# Patient Record
Sex: FEMALE | Race: ASIAN | NOT HISPANIC OR LATINO | Employment: UNEMPLOYED | URBAN - METROPOLITAN AREA
[De-identification: names, ages, dates, MRNs, and addresses within clinical notes are randomized per-mention and may not be internally consistent; named-entity substitution may affect disease eponyms.]

---

## 2020-06-19 ENCOUNTER — OFFICE VISIT (OUTPATIENT)
Dept: ORTHOPEDIC SURGERY | Facility: CLINIC | Age: 35
End: 2020-06-19

## 2020-06-19 VITALS — HEIGHT: 60 IN | BODY MASS INDEX: 29.43 KG/M2 | TEMPERATURE: 98 F | WEIGHT: 149.91 LBS

## 2020-06-19 DIAGNOSIS — M75.42 IMPINGEMENT SYNDROME OF LEFT SHOULDER: ICD-10-CM

## 2020-06-19 DIAGNOSIS — M25.512 LEFT SHOULDER PAIN, UNSPECIFIED CHRONICITY: Primary | ICD-10-CM

## 2020-06-19 PROCEDURE — 99203 OFFICE O/P NEW LOW 30 MIN: CPT | Performed by: ORTHOPAEDIC SURGERY

## 2020-06-19 PROCEDURE — 73030 X-RAY EXAM OF SHOULDER: CPT | Performed by: ORTHOPAEDIC SURGERY

## 2020-06-19 RX ORDER — NAPROXEN 500 MG/1
500 TABLET ORAL 2 TIMES DAILY
COMMUNITY
Start: 2020-04-14

## 2020-06-19 NOTE — PROGRESS NOTES
New Left Shoulder      Patient: Hussein Spangler        YOB: 1985    Medical Record Number: [id number]        Chief Complaints: left shoulder pain      History of Present Illness: This is a 35-year-old right-hand-dominant female who presents complaining of left shoulder pain is been ongoing for 2 months no history injury change in activity no history of similar symptoms.  She states she had this left wrist pain approximately 6 years ago when she was pregnant and left shoulder pain now is mostly the shoulder was hurting at night but she is been doing some anti-inflammatories and that is really resolved her symptoms.  Should be noted that her  was on FaceTime and was involved in the evaluation and exam in its entirety.  Current symptoms are moderate constant aching worse with standing sitting walking better with rest she is a homemaker past medical history is unremarkable      Allergies:   Allergies   Allergen Reactions   • Sulfamethoxazole-Trimethoprim Rash       Medications:   Home Medications:  Current Outpatient Medications on File Prior to Visit   Medication Sig   • naproxen (NAPROSYN) 500 MG tablet Take 500 mg by mouth 2 (Two) Times a Day.     No current facility-administered medications on file prior to visit.      Current Medications:  Scheduled Meds:  Continuous Infusions:  No current facility-administered medications for this visit.   PRN Meds:.    History reviewed. No pertinent past medical history.     Past Surgical History:   Procedure Laterality Date   •  SECTION          Social History     Occupational History   • Not on file   Tobacco Use   • Smoking status: Never Smoker   • Smokeless tobacco: Never Used   Substance and Sexual Activity   • Alcohol use: Never     Frequency: Never   • Drug use: Never   • Sexual activity: Not on file      Social History     Social History Narrative   • Not on file      History reviewed. No pertinent family history.          Review  "of Systems: 14 point review of systems are remarkable for the shoulder pain only the remainder negative per the patient    Review of Systems      Physical Exam: 35 y.o. female  General Appearance:    Alert, cooperative, in no acute distress                   Vitals:    06/19/20 1615   Temp: 98 °F (36.7 °C)   TempSrc: Temporal   Weight: 68 kg (149 lb 14.6 oz)   Height: 152.4 cm (60\")   PainSc:   5   PainLoc: Shoulder      Patient is alert and read ×3 no acute distress appears her above-listed at height weight and age.  Affect is normal respiratory rate is normal unlabored. Heart rate regular rate rhythm, sclera, dentition and hearing are normal for the purpose of this exam.    Ortho Exam    Physical exam of the left shoulder reveals no overlying skin changes no lymphedema no lymphadenopathy.  The patient can actively flex to 180, abduction is similar external rotation is to 50, internal rotation to the upper lumbar spine.  Rotator cuff strength is 4+ to 5 over 5 with isometric strength testing without symptoms.  The patient has good cervical range of motion full and asymptomatic no radicular symptoms and a normal elbow exam.  Patient has good distal pulses    Procedures          Radiology:   AP, Scapular Y and Axillary Lateral of the left shoulder were ordered/reviewed to evauate shoulder pain.  Imaging Results (Most Recent)     Procedure Component Value Units Date/Time    XR Shoulder 2+ View Left [[id number]] Resulted:  06/19/20 1431     Updated:  06/19/20 1610    Impression:       Ordering physician's impression is located in the Encounter Note dated 06/19/20. X-ray performed in the DR room.          Assessment/Plan: Left shoulder pain I really think this was probably more impingement she has a slight rounded shoulder posture which we talked about I suspect the anti-inflammatories have calm this down we did talk about the fact if it returns we now have x-rays my first line of treatment would probably be an injection " we also also talked about things to avoid

## 2020-07-10 ENCOUNTER — HOSPITAL ENCOUNTER (OUTPATIENT)
Dept: PHYSICAL THERAPY | Facility: HOSPITAL | Age: 35
Setting detail: THERAPIES SERIES
Discharge: HOME | End: 2020-07-10

## 2020-07-10 DIAGNOSIS — M25.512 CHRONIC LEFT SHOULDER PAIN: Primary | ICD-10-CM

## 2020-07-10 DIAGNOSIS — G89.29 CHRONIC LEFT SHOULDER PAIN: Primary | ICD-10-CM

## 2020-07-10 DIAGNOSIS — R29.898 WEAKNESS OF SHOULDER: ICD-10-CM

## 2020-07-10 PROCEDURE — 97161 PT EVAL LOW COMPLEX 20 MIN: CPT

## 2020-07-10 PROCEDURE — 97530 THERAPEUTIC ACTIVITIES: CPT

## 2020-07-10 NOTE — THERAPY EVALUATION
Outpatient Physical Therapy Ortho Initial Evaluation  [facility]     Patient Name: Hussein Spangler  : 1985  MRN: [id number]  Today's Date: 7/10/2020      Visit Date: 07/10/2020    There is no problem list on file for this patient.       History reviewed. No pertinent past medical history.     Past Surgical History:   Procedure Laterality Date   •  SECTION         Visit Dx:     ICD-10-CM ICD-9-CM   1. Chronic left shoulder pain M25.512 719.41    G89.29 338.29   2. Weakness of shoulder R29.898 719.61         Patient History     Row Name 07/10/20 1200             History    Chief Complaint  Pain  -RS      Type of Pain  Shoulder pain  -RS      Date Current Problem(s) Began  04/10/20  -RS      Brief Description of Current Complaint  The pt is a 34 yo female who presents with L shoulder pain of insidious onset, present  Since April or May. Her pain began in her wrist but that is gone, now it is in her shoulder. She was taking medicine for the pain but now she is no longer. Her pain has been improving gradually. She can dress fine but has trouble lifting or carrying, washing dishes, cleaning. She is sleeping better now than she did before. She has used icy hot but that didn't seem to make a difference. Rest is what helps. She denies numbness or tingling.    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  No  -RS      Difficulties with ADL's?  cleaning, cooking, lifting, carrying  -RS         Fall Risk Assessment    Any falls in the past year:  No  -RS      Number of falls reported in the last 12 months  --  -RS         Services    Are you currently receiving Home Health services  No  -RS         Daily Activities    Primary  Language  English  -RS      Are you able to read  Yes  -RS      Are you able to write  Yes  -RS      How does patient learn best?  Listening;Reading;Pictures/Video  -RS      Pt Participated in POC and Goals  Yes  -RS         Safety    Are you being hurt, hit, or frightened by anyone at home or in your life?  No  -RS      Are you being neglected by a caregiver  No  -RS        User Key  (r) = Recorded By, (t) = Taken By, (c) = Cosigned By    Initials Name Provider Type    RS Sophie Gold PT Physical Therapist          PT Ortho     Row Name 07/10/20 1200       Posture/Observations    Alignment Options  Forward head;Thoracic kyphosis;Rounded shoulders  -RS    Forward Head  Mild;Moderate  -RS    Thoracic Kyphosis  Mild  -RS    Rounded Shoulders  Moderate  -RS       Cervical/Shoulder ROM Screen    Cervical flexion  Normal  -RS    Cervical extension  Normal  -RS    Cervical lateral flexion  Normal  -RS    Cervical rotation  Normal  -RS    Cervical quadrant (Spurling's)  Normal  -RS       Special Tests/Palpation    Special Tests/Palpation  Shoulder  -RS       Shoulder Impingement/Rotator Cuff Special Tests    Bruce-George Test (RC Lesion vs. Bursitis)  Left:;Negative reports very mild pain  -RS    Neer Impingement Test (RC Lesion vs. Bursitis)  Left:;Negative  -RS    Empty Can Test (RC Lesion)  Bilateral:;Negative  -RS    Lift-Off Test (Subscapularis Lesion)  Bilateral:;Negative  -RS       General ROM    LT Upper Ext  Lt Shoulder ABduction;Lt Shoulder Flexion;Lt Shoulder External Rotation;Lt Shoulder Internal Rotation  -RS       Left Upper Ext    Lt Shoulder Abduction AROM  0-175 painfree  -RS    Lt Shoulder Flexion AROM  0-180 painfree  -RS    Lt Shoulder External Rotation AROM  ROCKY to mid thoracic spine painfree  -RS    Lt Shoulder Internal Rotation AROM  FIR to contralateral scapula  -RS       MMT (Manual Muscle Testing)    Rt Upper Ext  Rt Shoulder Flexion;Rt Shoulder ABduction;Rt Shoulder Internal Rotation;Rt  Shoulder External Rotation;Rt Elbow Flexion;Rt Elbow Extension  -RS    Lt Upper Ext  Lt Shoulder Flexion;Lt Shoulder ABduction;Lt Shoulder Internal Rotation;Lt Shoulder External Rotation;Lt Elbow Extension;Lt Elbow Flexion  -RS       MMT Right Upper Ext    Rt Shoulder Flexion MMT, Gross Movement  (4+/5) good plus  -RS    Rt Shoulder ABduction MMT, Gross Movement  (4+/5) good plus  -RS    Rt Shoulder Internal Rotation MMT, Gross Movement  (4+/5) good plus  -RS    Rt Shoulder External Rotation MMT, Gross Movement  (4/5) good  -RS    Rt Elbow Flexion MMT, Gross Movement:  (5/5) normal  -RS    Rt Elbow Extension MMT, Gross Movement:  (4+/5) good plus  -RS       MMT Left Upper Ext    Lt Shoulder Flexion MMT, Gross Movement  (4+/5) good plus  -RS    Lt Shoulder ABduction MMT, Gross Movement  (4/5) good  -RS    Lt Shoulder Internal Rotation MMT, Gross Movement  (4/5) good  -RS    Lt Shoulder External Rotation MMT, Gross Movement  (4/5) good  -RS    Lt Elbow Flexion MMT, Gross Movement  (4+/5) good plus  -RS    Lt Elbow Extension MMT, Gross Movement  (4+/5) good plus  -RS       Sensation    Sensation WNL?  WNL  -RS      User Key  (r) = Recorded By, (t) = Taken By, (c) = Cosigned By    Initials Name Provider Type    Sophie Larios PT Physical Therapist                      Therapy Education  Education Details: Role of outpatient PT, POC, differential diagnosis, initial HEP, expectations, goals, anatomy.  Given: Symptoms/condition management, HEP  Program: New  How Provided: Verbal, Demonstration, Written  Provided to: Patient  Level of Understanding: Teach back education performed, Demonstrated, Verbalized     PT OP Goals     Row Name 07/10/20 1200          PT Short Term Goals    STG Date to Achieve  07/12/20  -RS     STG 1  The pt will demonstrate IND and compliant with initial HEP focused on activation of scap stabilizing muscles and improved painfree mobility.  -RS     STG 1 Progress  New;Partially Met  -RS         Long Term Goals    LTG Date to Achieve  --  -RS     LTG 1  --  -RS     LTG 1 Progress  --  -RS     LTG 2  --  -RS     LTG 2 Progress  --  -RS     LTG 3  --  -RS     LTG 3 Progress  --  -RS        Time Calculation    PT Goal Re-Cert Due Date  10/08/20  -RS       User Key  (r) = Recorded By, (t) = Taken By, (c) = Cosigned By    Initials Name Provider Type    RS Sophie Gold, PT Physical Therapist          PT Assessment/Plan     Row Name 07/10/20 1200          PT Assessment    Functional Limitations  Limitation in home management;Performance in self-care ADL;Performance in leisure activities  -RS     Impairments  Range of motion;Poor body mechanics;Posture;Pain;Muscle strength;Joint mobility  -RS     Assessment Comments  Hussein Spangler is a 35 y.o. female referred to physical therapy for L shoulder pain. She presents with a stable clinical presentation, along with no remarkable comorbidities or personal factors that may impact her progress in the plan of care. Pt presents today with rounded shoulders, some decrease in shoulder ER strength, some pain with impingement position (rated as very mild) . her signs and symptoms are consistent with a physical therapy diagnosis of L shoulder impingement secondary to poor posture. The previous impairments limit her ability to perform household chores, lift/carry groceries, clean. The pt self scores 31% disability on the QuickDASH (0=no disability).Pt will benefit from skilled PT to address the previous impairments and return to PLOF however pt requests one time visit to PT to establish HEP.  -RS     Please refer to paper survey for additional self-reported information  Yes  -RS     Rehab Potential  Good  -RS     Patient/caregiver participated in establishment of treatment plan and goals  Yes  -RS     Patient would benefit from skilled therapy intervention  Yes  -RS        PT Plan    PT Frequency  One time visit  -RS     Predicted Duration of Therapy Intervention  (Therapy Eval)  8 weeks  -RS     Planned CPT's?  PT EVAL LOW COMPLEXITY: 56086;PT RE-EVAL: 90748;PT THER PROC EA 15 MIN: 39321;PT THER ACT EA 15 MIN: 41017;PT MANUAL THERAPY EA 15 MIN: 62256;PT NEUROMUSC RE-EDUCATION EA 15 MIN: 47631;PT SELF CARE/HOME MGMT/TRAIN EA 15: 80681;PT HOT OR COLD PACK TREAT MCARE;PT ELECTRICAL STIM UNATTEND: ;PT ULTRASOUND EA 15 MIN: 14772  -RS     PT Plan Comments  Plan to leave case open for 8 weeks if no return call will DC at that time, educated pt regarding role of PT and limitations that could be addressed, pt requests one time visit to establish HEP, reports will call if the need arises.  -RS       User Key  (r) = Recorded By, (t) = Taken By, (c) = Cosigned By    Initials Name Provider Type    Sophie Larios, PT Physical Therapist            OP Exercises     Row Name 07/10/20 1200             Total Minutes    09672 - PT Therapeutic Activity Minutes  12  -RS         Exercise 1    Exercise Name 1  education regarding posture and HEP  -RS      Additional Comments  sidelying ER, supine B shoulder ER, supine B shoulder HA, low doorway stretch, rows with TB, posterior shoulder rolls  -RS        User Key  (r) = Recorded By, (t) = Taken By, (c) = Cosigned By    Initials Name Provider Type    RS Sophie oGld, PT Physical Therapist                        Outcome Measure Options: Quick DASH  Quick DASH  Open a tight or new jar.: No Difficulty  Do heavy household chores (e.g., wash walls, wash floors): Moderate Difficulty  Carry a shopping bag or briefcase: Mild Difficulty  Wash your back: No Difficulty  Use a knife to cut food: No Difficulty  Recreational activities in which you take some force or impact through your arm, should or hand (e.g. golf, hammering, tennis, etc.): No Difficulty  During the past week, to what extent has your arm, shoulder, or hand problem interfered with your normal social activites with family, friends, neighbors or groups?: Extremely  During the past  week, were you limited in your work or other regular daily activities as a result of your arm, shoulder or hand problem?: Very limited  Arm, Shoulder, or hand pain: Moderate  Tingling (pins and needles) in your arm, shoulder, or hand: None  During the past week, how much difficulty have you had sleeping because of the pain in your arm, shoulder or hand?: Moderate Difficiculty  Number of Questions Answered: 11  Quick DASH Score: 31.82         Time Calculation:     Start Time: 1215  Stop Time: 1245  Time Calculation (min): 30 min     Therapy Charges for Today     Code Description Service Date Service Provider Modifiers Qty    [id number]  PT THERAPEUTIC ACT EA 15 MIN 7/10/2020 Sophie oGld, PT GP 1    [id number]  PT EVAL LOW COMPLEXITY 1 7/10/2020 Sophie Gold, PT GP 1          PT G-Codes  Outcome Measure Options: Quick DASH  Quick DASH Score: 31.82         Sophie Gold, PT  7/10/2020

## 2021-04-16 ENCOUNTER — BULK ORDERING (OUTPATIENT)
Dept: CASE MANAGEMENT | Facility: OTHER | Age: 36
End: 2021-04-16

## 2021-04-16 DIAGNOSIS — Z23 IMMUNIZATION DUE: ICD-10-CM
